# Patient Record
Sex: MALE | Race: WHITE
[De-identification: names, ages, dates, MRNs, and addresses within clinical notes are randomized per-mention and may not be internally consistent; named-entity substitution may affect disease eponyms.]

---

## 2018-09-28 ENCOUNTER — HOSPITAL ENCOUNTER (OUTPATIENT)
Dept: HOSPITAL 72 - SUR | Age: 48
Discharge: HOME | End: 2018-09-28
Payer: COMMERCIAL

## 2018-09-28 VITALS — SYSTOLIC BLOOD PRESSURE: 142 MMHG | DIASTOLIC BLOOD PRESSURE: 79 MMHG

## 2018-09-28 VITALS — DIASTOLIC BLOOD PRESSURE: 81 MMHG | SYSTOLIC BLOOD PRESSURE: 127 MMHG

## 2018-09-28 VITALS — WEIGHT: 250 LBS | BODY MASS INDEX: 33.86 KG/M2 | HEIGHT: 72 IN

## 2018-09-28 VITALS — DIASTOLIC BLOOD PRESSURE: 76 MMHG | SYSTOLIC BLOOD PRESSURE: 136 MMHG

## 2018-09-28 VITALS — DIASTOLIC BLOOD PRESSURE: 65 MMHG | SYSTOLIC BLOOD PRESSURE: 109 MMHG

## 2018-09-28 VITALS — DIASTOLIC BLOOD PRESSURE: 96 MMHG | SYSTOLIC BLOOD PRESSURE: 136 MMHG

## 2018-09-28 VITALS — DIASTOLIC BLOOD PRESSURE: 66 MMHG | SYSTOLIC BLOOD PRESSURE: 112 MMHG

## 2018-09-28 VITALS — DIASTOLIC BLOOD PRESSURE: 74 MMHG | SYSTOLIC BLOOD PRESSURE: 112 MMHG

## 2018-09-28 VITALS — SYSTOLIC BLOOD PRESSURE: 130 MMHG | DIASTOLIC BLOOD PRESSURE: 84 MMHG

## 2018-09-28 VITALS — DIASTOLIC BLOOD PRESSURE: 78 MMHG | SYSTOLIC BLOOD PRESSURE: 123 MMHG

## 2018-09-28 DIAGNOSIS — S46.112A: Primary | ICD-10-CM

## 2018-09-28 DIAGNOSIS — M10.9: ICD-10-CM

## 2018-09-28 DIAGNOSIS — N40.0: ICD-10-CM

## 2018-09-28 DIAGNOSIS — E66.9: ICD-10-CM

## 2018-09-28 DIAGNOSIS — F41.9: ICD-10-CM

## 2018-09-28 PROCEDURE — 94003 VENT MGMT INPAT SUBQ DAY: CPT

## 2018-09-28 PROCEDURE — 24341 RPR TDN/MUSC UPR A/E EACH: CPT

## 2018-09-28 PROCEDURE — 94150 VITAL CAPACITY TEST: CPT

## 2018-09-28 PROCEDURE — 73070 X-RAY EXAM OF ELBOW: CPT

## 2018-09-28 PROCEDURE — 76001: CPT

## 2018-09-28 NOTE — PRE-PROCEDURE NOTE/ATTESTATION
Pre-Procedure Note/Attestation


Complete Prior to Procedure


Planned Procedure:  left


Procedure Narrative:


distal biceps tendon repair





Indications for Procedure


Pre-Operative Diagnosis:


left distal biceps tendon tear





Attestation


I attest that I discussed the nature of the procedure; its benefits; risks and 

complications; and alternatives (and the risks and benefits of such alternatives

), prior to the procedure, with the patient (or the patient's legal 

representative).





I attest that, if there was a reasonable possibility of needing a blood 

transfusion, the patient (or the patient's legal representative) was given the 

[facility] and the [facility] standardized written summary, pursuant 

to the [place] Blood Safety Act ([place] Health and Safety Code # 1645, as 

amended).





I attest that I re-evaluated the patient just prior to the surgery and that 

there has been no change in the patient's H&P, except as documented below:











Pepito Sainz MD Sep 28, 2018 07:43

## 2018-09-28 NOTE — 48 HOUR POST ANESTHESIA EVAL
Post Anesthesia Evaluation


Procedure:  L Distal Biceps Repair


Date of Evaluation:  Sep 28, 2018


Time of Evaluation:  20:06


Blood Pressure Systolic:  132


0:  87


Pulse Rate:  83


Respiratory Rate:  18


Temperature (Fahrenheit):  98.4


O2 Sat by Pulse Oximetry:  98


Airway:  patent


Nausea:  No


Vomiting:  No


Pain Intensity:  1


Hydration Status:  adequate


Cardiopulmonary Status:


Stable


Mental Status/LOC:  patient returned to baseline


Follow-up Care/Observations:


0


Post-Anesthesia Complications:


0


Follow-up care needed:  ready to discharge











Willie Craig MD Sep 28, 2018 16:36

## 2018-09-28 NOTE — OPERATIVE NOTE - PDOC
Operative Note


Operative Note


Pre-op Diagnosis:


left distal biceps tendon tear


Procedure:


see op report


Post-op Diagnosis:  same as pre-op plus


Operative Findings:  consistent w/pre-op dx studies


Anesthesia:  general


Specimen:  none


Complications:  none


Condition:  stable


Estimated Blood Loss:  none


Drains:  none


Implant(s) used?:  Yes











Pepito Sainz MD Sep 28, 2018 07:44

## 2018-09-28 NOTE — IMMEDIATE POST-OP EVALUATION
Immediate Post-Op Evalulation


Immediate Post-Op Evalulation


Procedure:  L Distal Biceps Repair


Date of Evaluation:  Sep 28, 2018


Time of Evaluation:  18:00


IV Fluids:  1000 LR


Blood Products:  0


Estimated Blood Loss:  20


Urinary Output:  0


Blood Pressure Systolic:  136


Blood Pressure Diastolic:  96


Pulse Rate:  88


Respiratory Rate:  16


O2 Sat by Pulse Oximetry:  100


Temperature (Fahrenheit):  98.2


Pain Score (1-10):  1


Nausea:  No


Vomiting:  No


Complications


0


Patient Status:  awake, reacts, patent, none


Hydration Status:  adequate


Dru Gram Ancef IV


Given Within 1 Hr of Incision:  Yes


Time Given:  16:01











Willie Craig MD Sep 28, 2018 16:35

## 2018-09-28 NOTE — ANETHESIA PREOPERATIVE EVAL
Anesthesia Pre-op PMH/ROS


General


Date of Evaluation:  Sep 28, 2018


Time of Evaluation:  15:47


Anesthesiologist:  Rafa


ASA Score:  ASA 3


Mallampati Score


Class I : Soft palate, uvula, fauces, pillars visible


Class II: Soft palate, uvula, fauces visible


Class III: Soft palate, base of uvula visible


Class IV: Only hard plate visible


Mallampati Classification:  Class III


Surgeon:  Alistair


Diagnosis:  L Distal Biceps Tear


Surgical Procedure:  L Distal Biceps Repair


Anesthesia History:  none


Family History:  no anesthesia problems


Allergies:  


Coded Allergies:  


     ASPIRIN (Verified  Allergy, Severe, 18)


 SWELLING, STOP BREATHING


     AZITHROMYCIN (Verified  Allergy, Severe, 18)


 HIGH FEVER, STOP BREATHING


     CEPHALOSPORINS (Verified  Allergy, Severe, 18)


 HIGH FEVER, STOP BREATHING


     PENICILLINS (Verified  Allergy, Severe, 18)


 HIGH FEVER, STOP BREATHING


Medications:  see eMAR





Past Medical History


Gastrointestinal/Genitourinary:  Reports: other - BPH


Neurologic/Psychiatric:  Reports: depression/anxiety


Hematology/Immune:  Reports: other - HIV


Musculoskeletal/Integumentary:  Reports: other - Gout


Other:  obesity - BMI 37





Anesthesia Pre-op Phys. Exam


Physician Exam





Last Vital Signs








  Date Time  Temp Pulse Resp B/P (MAP) Pulse Ox O2 Delivery O2 Flow Rate FiO2


 


18 13:27 98.0 80 18 112/74 (87) 98   





 98.0       


 


18 13:05      Room Air  








Constitutional:  NAD


Neurologic:  CN 2-12 intact


Cardiovascular:  RRR


Respiratory:  CTA


Gastrointestinal:  S/NT/ND





Airway Exam


Mallampati Score:  Class III


MO:  limited


ROM:  limited


Teeth:  missing





Anesthesia Pre-op A/P


Risk Assessment & Plan


Assessment:


ASA 3


Plan:


GA, SED, GlideScope Go





Pre-Antibiotics


Dru Gram Ancef IV


Given Within 1 Hr of Incision:  Yes


Time Given:  16:01











Willie Craig MD Sep 28, 2018 16:34

## 2018-09-29 NOTE — OPERATIVE NOTE - DICTATED
DATE OF OPERATION:  09/28/2018



PREOPERATIVE DIAGNOSIS:  Left distal biceps tendon tear.



POSTOPERATIVE DIAGNOSIS:  Left distal biceps tendon tear.



PROCEDURE:

1. Open repair, left distal biceps tendon.

2. Application of long arm posterior splint.



SURGEON:  Pepito Sainz M.D.



ASSISTANT: None. 



ANESTHESIOLOGIST:  Willie Craig M.D.



ANESTHESIA: General.



INDICATION FOR PROCEDURE:  The patient is a pleasant 48-year-old gentleman,

who sustained an acute distal biceps tendon rupture.  He was indicative of

operative fixation.  Risks, limitations, expectations, and complications

of the procedure were discussed in detail.  All questions were

addressed.



DESCRIPTION OF PROCEDURE:  After informed consent was obtained, the patient

was brought to the operating room.  The patient was placed supine under

general anesthesia.  The left arm was prepped and draped in sterile

manner.  Time-out was performed.  A posterolateral incision was then made

over the greater tuberosity.  Blunt dissection down to the great

tuberosity was performed.  The biceps tendon was identified.  FiberWire

was then passed doing Krackow stitch and the distal stump of the biceps

tendon was trimmed.  At this point, a guidewire was then placed into

greater tuberosity.  Fluoroscopic imaging was used to confirm its

placement.  Once appropriate placement was performed, a 7 mm tunnel was

created.  Once that was done, the button was then used to pass the graft

into the bone tunnel.  It was then secured upon itself along with

interference screw.  The wound was copiously irrigated.  The skin was

approximated with 2-0 Vicryl suture, 3-0 Monocryl suture, and Dermabond.

Posterior splint was applied.  The patient was awoken and taken to

recovery room with stable vital signs.



ESTIMATED BLOOD LOSS:  Minimal.



COMPLICATIONS:  None.



SPECIMENS:  None.



IMPLANTS:  Include an Arthrex distal biceps tendon repair fixation

system.









  ______________________________________________

  Pepito Sainz M.D.





DR:  Yosvany

D:  09/28/2018 17:40

T:  09/28/2018 23:43

JOB#:  [id number]

CC:



CARLOS

## 2019-04-02 NOTE — DIAGNOSTIC IMAGING REPORT
Chief Complaint   Patient presents with    Knee Pain     #3 EUFLEXXA ZAIRA KNEES     The patient returns today for their third and final right and left knee injection for osteoarthritis. The risks, benefits, and complications of the injections were again discussed in detail with the patient. The risks discussed include but are not limited to infection, skin reactions, hot swollen joints, and anaphylaxis. The patient gave verbal informed consent for the injection. The patient's skin was prepped with 3 sterile gauze pads soaked with alcohol solution and the knee joint was injected with 2cc euflexxa intra-articularly under sterile conditions. Technique: Under sterile conditions a SoneZelleron ultrasound unit with a variable frequency (6.0-15.0 MHz) linear transducer was used to localize the placement of a 22-gauge needle into the right and left knee joint. Findings: Successful needle placement for intra-articular Visco supplementation injection. Final images were taken and saved for permanent record. The patient tolerated the injection reasonably well. The patient was given instructions to ice of the knee and avoid strenuous activity for 24-48 hours. The patient was instructed to call the office immediately if there is increased pain, redness, warmth, fever, or chills. We will see the patient back on an as-needed basis  from this point. Jarrell Charlton, [facility]    This dictation was performed with a verbal recognition program [facility]) and it was checked for errors. It is possible that there are still dictated errors within this office note. If so, please bring any errors to my attention for an addendum. All efforts were made to ensure that this office note is accurate. INDICATION:  Pain, intraoperative

 

TECHNIQUE: Intraoperative imaging

 

Fluoroscopy time: 16 seconds

Total dose: 0.00777 mGym2

Total number of images: 2

 

COMPARISON: None

 

FINDINGS: Intraoperative images demonstrate a surgical staple posterior to the

proximal radius and 2 tunnels through the proximal radius

 

IMPRESSION: Intraoperative imaging, as described